# Patient Record
Sex: FEMALE | NOT HISPANIC OR LATINO | Employment: FULL TIME | URBAN - METROPOLITAN AREA
[De-identification: names, ages, dates, MRNs, and addresses within clinical notes are randomized per-mention and may not be internally consistent; named-entity substitution may affect disease eponyms.]

---

## 2023-03-20 ENCOUNTER — HOSPITAL ENCOUNTER (EMERGENCY)
Facility: CLINIC | Age: 58
Discharge: HOME | End: 2023-03-20
Attending: STUDENT IN AN ORGANIZED HEALTH CARE EDUCATION/TRAINING PROGRAM | Admitting: STUDENT IN AN ORGANIZED HEALTH CARE EDUCATION/TRAINING PROGRAM
Payer: COMMERCIAL

## 2023-03-20 VITALS
OXYGEN SATURATION: 99 % | SYSTOLIC BLOOD PRESSURE: 147 MMHG | TEMPERATURE: 98.7 F | HEART RATE: 90 BPM | DIASTOLIC BLOOD PRESSURE: 100 MMHG | RESPIRATION RATE: 18 BRPM

## 2023-03-20 DIAGNOSIS — L50.9 URTICARIA: ICD-10-CM

## 2023-03-20 PROBLEM — J45.909 ASTHMA: Status: ACTIVE | Noted: 2023-03-20

## 2023-03-20 PROBLEM — R73.03 PRE-DIABETES: Status: ACTIVE | Noted: 2020-04-29

## 2023-03-20 PROBLEM — E11.9 TYPE 2 DIABETES MELLITUS WITHOUT COMPLICATION, WITHOUT LONG-TERM CURRENT USE OF INSULIN (H): Status: ACTIVE | Noted: 2017-10-25

## 2023-03-20 PROBLEM — E78.5 HYPERLIPIDEMIA: Status: ACTIVE | Noted: 2017-10-25

## 2023-03-20 PROCEDURE — 99283 EMERGENCY DEPT VISIT LOW MDM: CPT | Performed by: STUDENT IN AN ORGANIZED HEALTH CARE EDUCATION/TRAINING PROGRAM

## 2023-03-20 PROCEDURE — 250N000012 HC RX MED GY IP 250 OP 636 PS 637: Performed by: STUDENT IN AN ORGANIZED HEALTH CARE EDUCATION/TRAINING PROGRAM

## 2023-03-20 PROCEDURE — 250N000013 HC RX MED GY IP 250 OP 250 PS 637: Performed by: STUDENT IN AN ORGANIZED HEALTH CARE EDUCATION/TRAINING PROGRAM

## 2023-03-20 RX ORDER — FAMOTIDINE 20 MG/1
20 TABLET, FILM COATED ORAL ONCE
Status: COMPLETED | OUTPATIENT
Start: 2023-03-20 | End: 2023-03-20

## 2023-03-20 RX ORDER — MUPIROCIN 20 MG/G
OINTMENT TOPICAL
COMMUNITY
Start: 2022-12-14

## 2023-03-20 RX ORDER — MOMETASONE FUROATE 220 UG/1
INHALANT RESPIRATORY (INHALATION)
COMMUNITY
Start: 2021-10-18

## 2023-03-20 RX ORDER — PREDNISONE 20 MG/1
TABLET ORAL
COMMUNITY
Start: 2023-03-16 | End: 2023-03-22

## 2023-03-20 RX ORDER — EPINEPHRINE 0.3 MG/.3ML
0.3 INJECTION SUBCUTANEOUS
COMMUNITY
Start: 2023-03-15

## 2023-03-20 RX ORDER — PREDNISONE 20 MG/1
40 TABLET ORAL ONCE
Status: COMPLETED | OUTPATIENT
Start: 2023-03-20 | End: 2023-03-20

## 2023-03-20 RX ADMIN — PREDNISONE 40 MG: 20 TABLET ORAL at 21:06

## 2023-03-20 RX ADMIN — FAMOTIDINE 20 MG: 20 TABLET, FILM COATED ORAL at 21:05

## 2023-03-20 ASSESSMENT — ACTIVITIES OF DAILY LIVING (ADL): ADLS_ACUITY_SCORE: 33

## 2023-03-21 NOTE — ED TRIAGE NOTES
"Patient presents with itching and swelling. States it started about a week ago, was seen in [facility] and started on 60 of prednisone taper. Reports it had been better, but suddenly today the swelling has returned. States it is \"systemic\" as the swelling is around her shoulders, armpits, breast area, abdomen, and face.     Triage Assessment     Row Name 03/20/23 1958       Triage Assessment (Adult)    Airway WDL WDL       Respiratory WDL    Respiratory WDL WDL       Skin Circulation/Temperature WDL    Skin Circulation/Temperature WDL X  some redness where patient is itching herself       Cardiac WDL    Cardiac WDL WDL       Peripheral/Neurovascular WDL    Peripheral Neurovascular WDL WDL       Cognitive/Neuro/Behavioral WDL    Cognitive/Neuro/Behavioral WDL WDL              "

## 2023-03-21 NOTE — ED PROVIDER NOTES
History     Chief Complaint   Patient presents with     Lymphadenopathy     HPI  Shannan Olguin is a 57 year old female who presents to the department for evaluation of edema and hives which reoccurred 5 days after being evaluated at another emergency department and started on oral prednisone.  Patient explains that 5 days ago she abruptly developed diffuse pruritus, hives, and sensation of facial edema.  The provider at that time ordered a prescription for tapered prednisone beginning at 60 mg, she had taken her last dose of 60 mg last evening and was prepared to begin taper today.  However while at work this afternoon she again developed facial swelling and edema with torso and upper extremity pruritus.  Thinking back, she was not initially aware of any new food or environmental exposures but now recalls having chocolate milk of from [employer] today and also the prior day of initial reaction.  She is no longer taking Benadryl for symptoms, no other antihistamines today or symptomatic therapies.  Of note, patient denies headache, dizziness, fever, chills, sore throat, oral swelling, difficulty swallowing, wheezing, shortness of breath, vomiting or diarrhea.      Allergies:  Allergies   Allergen Reactions     Albuterol Nausea and Vomiting     Contrast Dye      Ibuprofen      Scopolamine      Zofran [Ondansetron]        Problem List:    Patient Active Problem List    Diagnosis Date Noted     Asthma 03/20/2023     Priority: Medium     Pre-diabetes 04/29/2020     Priority: Medium     Hyperlipidemia 10/25/2017     Priority: Medium     Type 2 diabetes mellitus without complication, without long-term current use of insulin (H) 10/25/2017     Priority: Medium     Chronic cough 06/29/2015     Priority: Medium     Irritable larynx syndrome 06/29/2015     Priority: Medium     Dysphonia 05/17/2015     Priority: Medium     Cough 05/17/2015     Priority: Medium     Influenza A 01/06/2014     Priority: Medium     GERD  (gastroesophageal reflux disease) 10/22/2013     Priority: Medium     Hypothyroidism 02/17/2012     Priority: Medium     Mild persistent asthma 04/26/2011     Priority: Medium     Chest pain 09/29/2009     Priority: Medium     Endometriosis of uterus 12/11/2008     Priority: Medium     Premenopausal menorrhagia 12/11/2008     Priority: Medium        Past Medical History:    Past Medical History:   Diagnosis Date     Asthma      Hypothyroid      Pregnant      Reflux        Past Surgical History:    Past Surgical History:   Procedure Laterality Date     C/SECTION, CLASSICAL      2     HYSTERECTOMY       Laproscopic Abdominal         Family History:    Family History   Problem Relation Age of Onset     Coronary Artery Disease Mother      Alcohol/Drug Brother        Social History:  Marital Status:  Unknown [6]  Social History     Tobacco Use     Smoking status: Never        Medications:    EPINEPHrine (ANY BX GENERIC EQUIV) 0.3 MG/0.3ML injection 2-pack  mometasone (ASMANEX, 60 METERED DOSES,) 220 MCG/ACT inhaler  mupirocin (BACTROBAN) 2 % external ointment  predniSONE (DELTASONE) 20 MG tablet  Fexofenadine HCl (ALLEGRA ALLERGY PO)  Levothyroxine Sodium (SYNTHROID PO)  Levothyroxine Sodium (SYNTHROID PO)  mometasone (ASMANEX) 110 MCG/INH inhaler  Montelukast Sodium (SINGULAIR PO)  omeprazole (PRILOSEC) 20 MG DR capsule  Omeprazole-Sodium Bicarbonate (ZEGERID PO)          Review of Systems  Constitutional:  Negative for fever or recent illness.  Cardiovascular:  Negative for chest discomfort.  Respiratory:  Negative for cough or difficulty breathing.   Gastrointestinal:  Negative for abdominal pain, diarrhea, nausea or vomiting.   Musculoskeletal: Negative for joint pain or swelling.  Neurological:  Negative for dizziness.    All others reviewed and are negative.      Physical Exam   BP: (!) 147/100  Pulse: 90  Temp: 98.7  F (37.1  C)  Resp: 18  SpO2: 99 %      Physical Exam  Constitutional:  Well developed, well  nourished.  Appears nontoxic and in no acute distress.    HENT:  Normocephalic and atraumatic.  Symmetric in appearance.  Eyes:  Conjunctivae are normal.  Cardiovascular:  No cyanosis.    Respiratory:  Effort normal without sign of respiratory distress.    Musculoskeletal:  Moves extremities spontaneously.  Neurological:  Patient is alert.  Skin:  Skin is warm and dry.  No appreciable rash on exposed skin.  Psychiatric:  Normal mood and affect.      ED Course                 Procedures              Critical Care time:  none               No results found for this or any previous visit (from the past 24 hour(s)).    Medications   predniSONE (DELTASONE) tablet 40 mg (40 mg Oral $Given 3/20/23 2106)   famotidine (PEPCID) tablet 20 mg (20 mg Oral $Given 3/20/23 2105)       Assessments & Plan (with Medical Decision Making)   Shannan Olguin is a 57 year old female who presents to the department for evaluation after report of rebound reaction while at work today.  Patient's symptoms have again subsided but she is concerned that pruritus and rash redeveloped today despite maintaining compliance with oral prednisone.  No sign of oral or airway involvement, nothing to suggest anaphylactic reaction.  Recommend patient continue with prednisone taper in addition to daily antihistamine such as Zyrtec or Claritin, Benadryl at night.  Referral order placed for urgent allergy evaluation over the next 1-2 weeks.  Patient is agreement discharge plan including return instructions.        Disclaimer:  This note consists of symbols derived from keyboarding, dictation, and/or voice recognition software.  As a result, there may be errors in the script that have gone undetected.  Please consider this when interpreting information found in the chart.        I have reviewed the nursing notes.    I have reviewed the findings, diagnosis, plan and need for follow up with the patient.             Discharge Medication List as of 3/20/2023   9:04 PM          Final diagnoses:   Urticaria       3/20/2023   [facility] EMERGENCY DEPT     Franko Valentine DO  03/20/23 [id number]